# Patient Record
Sex: FEMALE | URBAN - METROPOLITAN AREA
[De-identification: names, ages, dates, MRNs, and addresses within clinical notes are randomized per-mention and may not be internally consistent; named-entity substitution may affect disease eponyms.]

---

## 2019-08-05 ENCOUNTER — OFFICE VISIT (OUTPATIENT)
Dept: URBAN - METROPOLITAN AREA CLINIC 17 | Facility: CLINIC | Age: 44
End: 2019-08-05
Payer: OTHER GOVERNMENT

## 2019-08-05 DIAGNOSIS — H25.13 AGE-RELATED NUCLEAR CATARACT, BILATERAL: Primary | ICD-10-CM

## 2019-08-05 PROCEDURE — 92004 COMPRE OPH EXAM NEW PT 1/>: CPT | Performed by: OPHTHALMOLOGY

## 2019-08-05 ASSESSMENT — INTRAOCULAR PRESSURE
OS: 15
OD: 17

## 2019-08-05 ASSESSMENT — KERATOMETRY
OD: 44.75
OS: 45.00

## 2019-08-05 ASSESSMENT — VISUAL ACUITY
OD: 20/60
OS: 20/20

## 2019-08-05 NOTE — IMPRESSION/PLAN
Impression: Age-related nuclear cataract, bilateral: H25.13. Condition: established, worsening. Symptoms: could improve with surgery. Vision: vision affected. Plan: Cataract accounts for patient's complaints. Reviewed risks, benefits, and procedure. Patient desires surgery, schedule ce/iol OD, RL1, standard lens, distance refractive target, patient is clear for surgery in [facility]. No surgery recommended for OS at this time.

## 2019-08-08 ENCOUNTER — OFFICE VISIT (OUTPATIENT)
Dept: URBAN - METROPOLITAN AREA CLINIC 17 | Facility: CLINIC | Age: 44
End: 2019-08-08
Payer: OTHER GOVERNMENT

## 2019-08-08 DIAGNOSIS — H02.841 EDEMA OF RIGHT UPPER EYELID: Primary | ICD-10-CM

## 2019-08-08 PROCEDURE — 92002 INTRM OPH EXAM NEW PATIENT: CPT | Performed by: OPTOMETRIST

## 2019-08-08 PROCEDURE — 92012 INTRM OPH EXAM EST PATIENT: CPT | Performed by: OPTOMETRIST

## 2019-08-08 RX ORDER — NEOMYCIN SULFATE, POLYMYXIN B SULFATE AND DEXAMETHASONE 3.5; 10000; 1 MG/ML; [USP'U]/ML; MG/ML
SUSPENSION/ DROPS OPHTHALMIC
Qty: 1 | Refills: 0 | Status: INACTIVE
Start: 2019-08-08 | End: 2019-08-09

## 2019-08-08 ASSESSMENT — INTRAOCULAR PRESSURE
OD: 28
OS: 30

## 2019-08-08 NOTE — IMPRESSION/PLAN
Impression: Edema of right upper eyelid: H02.841. Plan: Discussed diagnosis in detail with patient. Discussed treatment options with patient. Patient instructed to apply warm compresses. 
Start Roni-poly-dex TID OD, pt to call office tomorrow am to let us know if she is feeling better, if condition worse in am Dr. Anibal Aparicio to send oral abx

## 2020-01-03 ENCOUNTER — PRE-OPERATIVE VISIT (OUTPATIENT)
Dept: URBAN - METROPOLITAN AREA CLINIC 17 | Facility: CLINIC | Age: 45
End: 2020-01-03
Payer: OTHER GOVERNMENT

## 2020-01-03 RX ORDER — TOBRAMYCIN 3 MG/ML
0.3 % SOLUTION/ DROPS OPHTHALMIC
Qty: 10 | Refills: 0 | Status: ACTIVE
Start: 2020-01-03

## 2020-01-03 RX ORDER — PREDNISOLONE ACETATE 10 MG/ML
1 % SUSPENSION/ DROPS OPHTHALMIC
Qty: 10 | Refills: 1 | Status: ACTIVE
Start: 2020-01-03

## 2020-01-03 ASSESSMENT — PACHYMETRY
OD: 24.94
OS: 3.49
OS: 23.48
OD: 3.27

## 2020-01-13 ENCOUNTER — SURGERY (OUTPATIENT)
Dept: URBAN - METROPOLITAN AREA SURGERY 7 | Facility: SURGERY | Age: 45
End: 2020-01-13
Payer: OTHER GOVERNMENT